# Patient Record
Sex: FEMALE | Race: WHITE | NOT HISPANIC OR LATINO | Employment: OTHER | URBAN - METROPOLITAN AREA
[De-identification: names, ages, dates, MRNs, and addresses within clinical notes are randomized per-mention and may not be internally consistent; named-entity substitution may affect disease eponyms.]

---

## 2021-05-24 ENCOUNTER — RECORDS - HEALTHEAST (OUTPATIENT)
Dept: ADMINISTRATIVE | Facility: CLINIC | Age: 76
End: 2021-05-24

## 2021-05-25 ENCOUNTER — RECORDS - HEALTHEAST (OUTPATIENT)
Dept: ADMINISTRATIVE | Facility: CLINIC | Age: 76
End: 2021-05-25

## 2024-08-01 ENCOUNTER — APPOINTMENT (OUTPATIENT)
Dept: RADIOLOGY | Facility: CLINIC | Age: 79
End: 2024-08-01
Payer: MEDICARE

## 2024-08-01 ENCOUNTER — HOSPITAL ENCOUNTER (EMERGENCY)
Facility: CLINIC | Age: 79
Discharge: HOME | End: 2024-08-01
Payer: MEDICARE

## 2024-08-01 VITALS
SYSTOLIC BLOOD PRESSURE: 198 MMHG | WEIGHT: 230 LBS | RESPIRATION RATE: 18 BRPM | HEIGHT: 65 IN | BODY MASS INDEX: 38.32 KG/M2 | HEART RATE: 78 BPM | DIASTOLIC BLOOD PRESSURE: 86 MMHG | TEMPERATURE: 98 F | OXYGEN SATURATION: 95 %

## 2024-08-01 DIAGNOSIS — S90.859A FOREIGN BODY IN FOOT: ICD-10-CM

## 2024-08-01 PROCEDURE — 99283 EMERGENCY DEPT VISIT LOW MDM: CPT

## 2024-08-01 PROCEDURE — 73630 X-RAY EXAM OF FOOT: CPT | Mod: LT

## 2024-08-01 RX ORDER — CEPHALEXIN 500 MG/1
500 CAPSULE ORAL 4 TIMES DAILY
Qty: 12 CAPSULE | Refills: 0 | Status: SHIPPED | OUTPATIENT
Start: 2024-08-01 | End: 2024-08-04

## 2024-08-01 ASSESSMENT — COLUMBIA-SUICIDE SEVERITY RATING SCALE - C-SSRS
2. HAVE YOU ACTUALLY HAD ANY THOUGHTS OF KILLING YOURSELF IN THE PAST MONTH?: NO
6. HAVE YOU EVER DONE ANYTHING, STARTED TO DO ANYTHING, OR PREPARED TO DO ANYTHING TO END YOUR LIFE?: NO
1. IN THE PAST MONTH, HAVE YOU WISHED YOU WERE DEAD OR WISHED YOU COULD GO TO SLEEP AND NOT WAKE UP?: NO

## 2024-08-01 NOTE — ED TRIAGE NOTES
Pt presents to the ED with c/o possible glass stuck in left foot. Pt thinks she stepped on glass 2 days ago and not pain seems worse. Sent by SUSIE.

## 2024-08-01 NOTE — ED PROVIDER NOTES
EMERGENCY DEPARTMENT ENCOUNTER      NAME: Malou Garcia  AGE: 78 year old female  YOB: 1945  MRN: [id number]  EVALUATION DATE & TIME: No admission date for patient encounter.    PCP: Martha Baker    ED PROVIDER: Spring Maciel PA-C      CHIEF COMPLAINT:  FB in the foot    IMPRESSION AND PLAN:      Malou Garcia is a 78 year old female with no pertinent history who presents to this ED by foot for evaluation of presumed glass in her left foot.  Vitals positive for hypertension at 198/86 but otherwise unremarkable.  No SIRS criteria.  On examination, well-appearing 78-year-old in no acute distress.  Left foot with small piece of glass noted in the heel.  It was not visualized with my eye but I could gently feel it when I arrived to my hand over the heel.  Sensation intact in the left foot.  Cap refill less than 2.  Able to move the foot and ambulate without difficulty    To see how deep it was and to assess for any other pieces of glass will obtain an x-ray. Provider in  said that glass is not radioopaque so did not recommend xray. From my experience, I have seen glass show up on xray but given size and location so will start with xray of left foot and assess.  X-ray does not reveal any fracture, dislocation, or radiopaque objects. ultrasound was contacted because the urgent care provider mention that ultrasound can be used to assess glass.  The ultrasound tech mention that it is not a very good imaging modality.  That is can be very nonspecific and probably not very helpful.  I brought the patient into room 16 with good lighting and try to better visualize the glass.  Using a semisterile field with draping and gloves, used the end of an 18-gauge needle to gently try to dislodge the glass and then used a small tweezers to pull it out.  I was able to get a 3 mm piece of glass out of the heel of her foot.  Near the more distal aspect of the ventral aspect of her foot on the ball of  the foot, there was another very small more like 1 mm piece of glass that I was able to dislodge with a similar technique.  After this, patient stood on her foot and reported that she did not feel the poking sensation that she did previously.    Given that it was in her foot for 2 days and it was fairly deep, will prescribe prophylactic antibiotics for 3 days since the foot can be a dirty area and given her age, I want to avoid any infection.  Will give Keflex.  Also instructed her to do foot soaks in case or any other really small pieces of glass they will work their way out.  Recommend she follow-up with her primary doctor.  All questions were answered.  Discharged in stable condition.  ED COURSE  Pertinent Labs & Imaging studies reviewed. (See chart for details)  1300 I met with the patient and obtained a history and performed a physical exam  1430 x-ray independently interpreted by myself reveals no radiopaque objects on the ventral aspect of the foot (heel)     1500 I attempted foreign body removal and was successfully able to remove the glass from the foot  1520 I discussed plan for discharge. Return precautions were discussed. Discharged by ED RN.     Medical Decision Making  Obtained supplemental history:Supplemental history obtained?: No  Reviewed external records: External records reviewed?: No  Care impacted by chronic illness:N/A  Care significantly affected by social determinants of health:N/A  Did you consider but not order tests?: Work up considered but not performed and documented in chart, if applicable  Did you interpret images independently?: My independent interpretation of imaging: Left foot x-ray independently interpreted by myself reveals a very faint small lucency in the heel in the area that I ended up removing the glass from.t  Consultation discussion with other provider:Did you involve another provider (consultant, , pharmacy, etc.)?: No  Discharge. I prescribed additional prescription  strength medication(s) as charted. See documentation for any additional details.    At the conclusion of the encounter I discussed the results of all of the tests and the disposition. The questions were answered. The patient or family acknowledged understanding and was agreeable with the care plan.     0 minutes of critical care time     MEDICATIONS GIVEN IN THE EMERGENCY:  Medications - No data to display    NEW PRESCRIPTIONS STARTED AT TODAY'S ER VISIT  Discharge Medication List as of 8/1/2024  3:27 PM        START taking these medications    Details   cephALEXin (KEFLEX) 500 MG capsule Take 1 capsule (500 mg) by mouth 4 times daily for 3 days, Disp-12 capsule, R-0, E-Prescribe           Discharge Medication List as of 8/1/2024  3:27 PM          =================================================================    HPI    Patient information was obtained from: patient  Use of : N/A     Malou Garcia is a 78 year old female with no pertinent history who presents to this ED by foot for evaluation of presumed glass in her left foot.  2 days ago was carrying in her groceries when she set it back up on the counter and a jar that was put in anything at the top fell out and landed on the ground splattering all over the patient.  Patient reports there was glass all over the floor.  She panicked and tried to make sure her dog did not step on anything in the meantime and stepped with her bare feet on the glass.  Initially reports that there were many pieces of glass in her foot that she was able to remove some of them at home but this 1 was small and on her heels so she was not able to see it because she cannot bend her legs far enough to see it.  She was seen in the urgent care and they recommended she come to the ER for an ultrasound. Provider in  said that glass is not radioopaque so did not recommend xray.  Reports the pain is worsening.  Denies fevers, chills, nausea, vomiting, or any other concerning  "symptoms.    PHYSICAL EXAM    BP (!) 198/86   Pulse 78   Temp 98  F (36.7  C) (Temporal)   Resp 18   Ht 1.651 m (5' 5\")   Wt 104.3 kg (230 lb)   SpO2 95%   BMI 38.27 kg/m    Constitutional: Well developed, Well nourished, NAD, GCS 15   HENT: Normocephalic, Atraumatic,   Eyes: , EOMI, Conjunctiva normal, No discharge.   Musculoskeletal: 2+ DP pulses. No edema.  No cyanosis, No clubbing. Good range of motion in all major joints. No tenderness to palpation or major deformities noted.  2 very small puncture wounds to the bottom of the left foot 1 in the heel and one up just proximal to the great toe and the ball of the foot.  When running my finger over top of this area, a sharp object was palpated.  No bleeding here.  No surrounding erythema.  Integument: Warm, Dry, No erythema, No rash..   Neurologic: Alert & oriented x 3, Normal motor function, Normal sensory function, No focal deficits noted. Normal gait.    Psychiatric: Affect normal, Judgment normal, Mood normal. Cooperative.      LAB:  All pertinent labs reviewed and interpreted.  Results for orders placed or performed during the hospital encounter of 08/01/24   Foot XR, G/E 3 views, left    Impression    IMPRESSION: Degenerative change across the midfoot at the TMT joints. Degenerative change first MTP joint. Plantar calcaneal spurring. No evidence for acute fracture or dislocation. No evidence for foreign body.       RADIOLOGY:  Reviewed all pertinent imaging. Please see official radiology report.  Foot XR, G/E 3 views, left   Final Result   IMPRESSION: Degenerative change across the midfoot at the TMT joints. Degenerative change first MTP joint. Plantar calcaneal spurring. No evidence for acute fracture or dislocation. No evidence for foreign body.        PROCEDURE: Foreign Body Removal   INDICATIONS: Foreign Body   PROCEDURE PROVIDER: Spring Maciel PA-C   SITE: Left heel/ventral aspect of the foot    CONSENT: Risks, benefits and alternatives were " discussed with and Verbal consent was obtained from Patient.       MEDICATION: N/A, no sedation meds were given   DESCRIPTION OF PROCEDURE: Dilated draped down to attempt to create a field to wear again.  I used gloves.  I palpated the area that I would be attempting to remove the foreign body and did palpate a small sharp object in the heel of the foot.  I used the sharp end of an 18-gauge needle to gently work around the object to loosen it.  I then took a tweezers and removed it.  I did the same procedure to the ball of the foot just proximal to the great toe.  Patient tolerated really well and did not complain of much pain.  There was no bleeding.  She was able to step on and ambulate without difficulty after.   COMPLICATIONS: Patient tolerated procedure well, without complication       Spring Maciel PA-C  [facility] EMERGENCY ROOM  [address] [facility] [address]  [phone]        Spring Maciel PA-C  08/01/24 1928

## 2024-08-01 NOTE — DISCHARGE INSTRUCTIONS
You were seen here in the ER for glass in your foot. I did not see it on xray but I was able to remove it. You now have a open wound on your foot. There is a possibility there could be other small pieces as well that I was unable to get. Because if this, we can do 3 days of antibiotic prophylaxis. This means it will kill any infection before it gets bad. I will prescribe Cephalexin. You can do warm soaks with warm soapy water to help clean it and work out any other small fragments that could be left behind. Return to the ER if you develop any new/worsening symptoms.